# Patient Record
Sex: MALE | Race: WHITE
[De-identification: names, ages, dates, MRNs, and addresses within clinical notes are randomized per-mention and may not be internally consistent; named-entity substitution may affect disease eponyms.]

---

## 2020-11-30 ENCOUNTER — APPOINTMENT (OUTPATIENT)
Dept: DISASTER EMERGENCY | Facility: CLINIC | Age: 70
End: 2020-11-30

## 2020-11-30 DIAGNOSIS — Z01.818 ENCOUNTER FOR OTHER PREPROCEDURAL EXAMINATION: ICD-10-CM

## 2020-11-30 PROBLEM — Z00.00 ENCOUNTER FOR PREVENTIVE HEALTH EXAMINATION: Status: ACTIVE | Noted: 2020-11-30

## 2020-12-01 LAB — SARS-COV-2 N GENE NPH QL NAA+PROBE: NOT DETECTED

## 2020-12-07 PROBLEM — Z00.00 ENCOUNTER FOR PREVENTIVE HEALTH EXAMINATION: Noted: 2020-12-07

## 2020-12-14 ENCOUNTER — APPOINTMENT (OUTPATIENT)
Dept: DISASTER EMERGENCY | Facility: CLINIC | Age: 70
End: 2020-12-14

## 2020-12-16 LAB — SARS-COV-2 N GENE NPH QL NAA+PROBE: NOT DETECTED

## 2020-12-20 ENCOUNTER — APPOINTMENT (OUTPATIENT)
Dept: DISASTER EMERGENCY | Facility: CLINIC | Age: 70
End: 2020-12-20

## 2020-12-22 LAB — SARS-COV-2 N GENE NPH QL NAA+PROBE: NOT DETECTED

## 2022-12-02 ENCOUNTER — OFFICE (OUTPATIENT)
Dept: URBAN - METROPOLITAN AREA CLINIC 105 | Facility: CLINIC | Age: 72
Setting detail: OPHTHALMOLOGY
End: 2022-12-02
Payer: COMMERCIAL

## 2022-12-02 DIAGNOSIS — H43.813: ICD-10-CM

## 2022-12-02 DIAGNOSIS — H35.033: ICD-10-CM

## 2022-12-02 PROCEDURE — 92014 COMPRE OPH EXAM EST PT 1/>: CPT | Performed by: OPHTHALMOLOGY

## 2022-12-02 PROCEDURE — 92250 FUNDUS PHOTOGRAPHY W/I&R: CPT | Performed by: OPHTHALMOLOGY

## 2022-12-02 ASSESSMENT — REFRACTION_MANIFEST
OD_CYLINDER: -0.50
OD_AXIS: 065
OS_VA1: 20/25
OS_CYLINDER: -1.25
OS_SPHERE: +1.00
OD_VA1: 20/20
OS_AXIS: 093
OD_SPHERE: +0.75
OS_ADD: +2.25
OD_ADD: +2.25

## 2022-12-02 ASSESSMENT — REFRACTION_CURRENTRX
OD_VPRISM_DIRECTION: PROGS
OS_SPHERE: +1.00
OD_SPHERE: +1.00
OS_AXIS: 090
OD_ADD: +2.00
OD_CYLINDER: -0.25
OD_AXIS: 080
OS_VPRISM_DIRECTION: PROGS
OS_OVR_VA: 20/
OS_CYLINDER: -1.25
OD_OVR_VA: 20/
OS_ADD: +2.00

## 2022-12-02 ASSESSMENT — AXIALLENGTH_DERIVED
OD_AL: 23.4014
OS_AL: 23.1643
OD_AL: 23.3536
OS_AL: 23.4014

## 2022-12-02 ASSESSMENT — CORNEAL EDEMA CLINICAL DESCRIPTION
OS_CORNEALEDEMA: ABSENT
OD_CORNEALEDEMA: ABSENT

## 2022-12-02 ASSESSMENT — REFRACTION_AUTOREFRACTION
OS_AXIS: 085
OD_AXIS: 047
OD_SPHERE: +0.50
OS_SPHERE: +2.00
OS_CYLINDER: -2.00
OD_CYLINDER: -0.25

## 2022-12-02 ASSESSMENT — KERATOMETRY
OD_K2POWER_DIOPTERS: 44.00
OS_AXISANGLE_DEGREES: 180
OD_K1POWER_DIOPTERS: 43.25
OS_K2POWER_DIOPTERS: 44.25
OD_AXISANGLE_DEGREES: 155
OS_K1POWER_DIOPTERS: 43.00

## 2022-12-02 ASSESSMENT — SPHEQUIV_DERIVED
OD_SPHEQUIV: 0.5
OD_SPHEQUIV: 0.375
OS_SPHEQUIV: 0.375
OS_SPHEQUIV: 1

## 2022-12-02 ASSESSMENT — VISUAL ACUITY
OD_BCVA: 20/20-1
OS_BCVA: 20/20-1

## 2022-12-02 ASSESSMENT — CONFRONTATIONAL VISUAL FIELD TEST (CVF)
OS_FINDINGS: FULL
OD_FINDINGS: FULL

## 2023-11-17 ENCOUNTER — HOME HEALTH ADMISSION (OUTPATIENT)
Dept: HOME HEALTH SERVICES | Facility: HOME HEALTH | Age: 73
End: 2023-11-17
Payer: MEDICARE

## 2023-11-18 ENCOUNTER — HOME CARE VISIT (OUTPATIENT)
Dept: SCHEDULING | Facility: HOME HEALTH | Age: 73
End: 2023-11-18
Payer: MEDICARE

## 2023-11-18 PROCEDURE — G0493 RN CARE EA 15 MIN HH/HOSPICE: HCPCS

## 2023-11-21 ENCOUNTER — HOME CARE VISIT (OUTPATIENT)
Dept: SCHEDULING | Facility: HOME HEALTH | Age: 73
End: 2023-11-21
Payer: MEDICARE

## 2023-11-21 VITALS
DIASTOLIC BLOOD PRESSURE: 67 MMHG | HEART RATE: 63 BPM | RESPIRATION RATE: 18 BRPM | TEMPERATURE: 97 F | SYSTOLIC BLOOD PRESSURE: 129 MMHG | OXYGEN SATURATION: 97 %

## 2023-11-21 PROCEDURE — G0152 HHCP-SERV OF OT,EA 15 MIN: HCPCS

## 2023-11-21 ASSESSMENT — ENCOUNTER SYMPTOMS: PAIN LOCATION - PAIN QUALITY: ACHE

## 2023-11-21 NOTE — HOME HEALTH
This patient has answered NO to the following questions:   1) have you traveled outside the country   2) have you been exposed to or been in contact with anyone whom traveled outside the country in the past two weeks   3) do you have a sore throat/fever/dry cough      4)The patient has been educated on and demonstrates good understanding via the teach-back method for the following: Signs and symptoms of COVID-19 yes    Pt is a 68year old male who lives with wife on first story apartment. Pt had a fall a month ago in bathroom and found to have pleural effusion with fluid drained from his lung. Pt followed with rehab and returned home 11/17/23. PMHX includes herniated disc, parkinsons. Pt ambulates in home with cane, uses walker outdoors for longer distances, requiring min assist for safety to reduce risk of falls. Pt transfers with min assist, some LOB demonstrated and decreaed eccentric control when sitting, forgets to reach for surfaces. Pt would benefit with raised toilet seat, shower chair and second  grab bar in shower with step over tub. Pt requires min assist for ADLs with decreased balance . Pt demo's decresaed standing tolerance/balance requiring frequent rest periods with ADL's  and UE support at all times. Pt demo's bilat UE strength 3+/5 for transfers. Pt would benefit with OT to increase safety and indep with ADL's, increase bilat UE strength for functional transfers, adaptive equipment recommendations and training, increasing standing tolerance/balance for safety with ADL's with fewer rest periods. Pt's goal is to have no falls and return to PLOF.    Discussed DC plan  with pt and caregiver, plan to DC when goals met  Pt's signature on paper due to computer issues

## 2023-11-22 ENCOUNTER — HOME CARE VISIT (OUTPATIENT)
Dept: HOME HEALTH SERVICES | Facility: HOME HEALTH | Age: 73
End: 2023-11-22
Payer: MEDICARE

## 2023-11-22 VITALS
RESPIRATION RATE: 18 BRPM | HEART RATE: 72 BPM | TEMPERATURE: 98.2 F | OXYGEN SATURATION: 98 % | DIASTOLIC BLOOD PRESSURE: 70 MMHG | SYSTOLIC BLOOD PRESSURE: 128 MMHG

## 2023-11-22 ASSESSMENT — ENCOUNTER SYMPTOMS: DYSPNEA ACTIVITY LEVEL: AFTER AMBULATING MORE THAN 20 FT

## 2023-11-22 NOTE — HOME HEALTH
SOC for 69 y/o M w/ parkinsons, HTN, Afib, bph, who was recently D/C from rehab on 11/17 s/p fall at home w/ R plueral effusion, rib fx and R shoulder injury. Caregiver involvement: Pt has strong spousal support. Medications reconciled. SN skin check - No wounds at this time. Home health supplies delivered/ordered this visit include: None needed at this time. Patient education provided this visit: Medication management, pt states they are taking medications as prescribed. Infection control and prevention measures, S/S of infection. Home safety, fall precautions. Patient/caregiver response: Verbalizes understanding via the teach back method. Progress toward goals: yes, see care plan  Home exercise program: PT/OT to eval and treat. Plan for next home care visit: SN for medication management/disease process management  The following discharge planning was discussed with the patient/caregiver: home care agency discharge to be completed when goals met. Patient in agreement. Patient remains homebound. See Homebound Status within Visit Record.

## 2023-11-24 ENCOUNTER — HOME CARE VISIT (OUTPATIENT)
Dept: HOME HEALTH SERVICES | Facility: HOME HEALTH | Age: 73
End: 2023-11-24
Payer: MEDICARE

## 2023-11-27 ENCOUNTER — HOME CARE VISIT (OUTPATIENT)
Dept: HOME HEALTH SERVICES | Facility: HOME HEALTH | Age: 73
End: 2023-11-27
Payer: MEDICARE

## 2023-11-30 ENCOUNTER — HOME CARE VISIT (OUTPATIENT)
Dept: SCHEDULING | Facility: HOME HEALTH | Age: 73
End: 2023-11-30
Payer: MEDICARE

## 2023-11-30 VITALS
OXYGEN SATURATION: 97 % | HEART RATE: 85 BPM | DIASTOLIC BLOOD PRESSURE: 81 MMHG | TEMPERATURE: 97.2 F | SYSTOLIC BLOOD PRESSURE: 136 MMHG | RESPIRATION RATE: 18 BRPM

## 2023-11-30 PROCEDURE — G0152 HHCP-SERV OF OT,EA 15 MIN: HCPCS

## 2023-11-30 ASSESSMENT — ENCOUNTER SYMPTOMS: PAIN LOCATION - PAIN QUALITY: ACHE

## 2023-11-30 NOTE — HOME HEALTH
This patient has answered NO to the following questions:   1) have you traveled outside the country   2) have you been exposed to or been in contact with anyone whom traveled outside the country in the past two weeks   3) do you have a sore throat/fever/dry cough      4)The patient has been educated on and demonstrates good understanding via the teach-back method for the following: Signs and symptoms of COVID-19 yes    No reported changes or incidents since previous visit  Patient instructed to continue HEP at least 2  daily for continued progression and benefits. Patient instructed to call 911 in case of medical emergency and to call MD for any changes to medical status. Therapist instructed patient to continue taking medications as prescribed by MD and obtain new prescription/refills from pharmacy as appropriate. Patient educated on importance of keeping all MD/lab/therapy appointments. Patient instructed to keep list of emergency phone numbers by the phone or in their mobile phone and to wash hands frequently to prevent spread of infection. Patient and wife verbalizes understanding. Pt was seen for OT and SN evaluation only, pt and wife requested DC stating he does not need further home care at this time.

## 2024-02-24 ENCOUNTER — HOME HEALTH ADMISSION (OUTPATIENT)
Dept: HOME HEALTH SERVICES | Facility: HOME HEALTH | Age: 74
End: 2024-02-24
Payer: MEDICARE

## 2024-02-25 ENCOUNTER — HOME CARE VISIT (OUTPATIENT)
Dept: HOME HEALTH SERVICES | Facility: HOME HEALTH | Age: 74
End: 2024-02-25

## 2024-02-26 ENCOUNTER — HOME CARE VISIT (OUTPATIENT)
Dept: SCHEDULING | Facility: HOME HEALTH | Age: 74
End: 2024-02-26
Payer: MEDICARE

## 2024-02-26 VITALS
WEIGHT: 174 LBS | RESPIRATION RATE: 18 BRPM | HEIGHT: 74 IN | SYSTOLIC BLOOD PRESSURE: 142 MMHG | OXYGEN SATURATION: 98 % | TEMPERATURE: 98.2 F | DIASTOLIC BLOOD PRESSURE: 80 MMHG | HEART RATE: 87 BPM | BODY MASS INDEX: 22.33 KG/M2

## 2024-02-26 PROCEDURE — G0299 HHS/HOSPICE OF RN EA 15 MIN: HCPCS

## 2024-02-26 ASSESSMENT — ENCOUNTER SYMPTOMS: PAIN LOCATION - PAIN QUALITY: ACHE

## 2024-02-26 NOTE — HOME HEALTH
Reason for the visit: to instruct the patient on meds, disease entity, and s/s to report to MD.  History of present illness: CHF   Chief complaint and PMH: CHF HTN A-FIB  General description of the patient: The patient is an alert male who reside with his wife. He does ambulate with cane in the home and walker outside. He's receptive to instructions and nurse visit.  Assessment, plan and focus of care:Reason for the visit: Continue to instruct on meds and assess cp and v/s on each visit. Reporting any values are not wnl  Reason for need for continued skilled care:  Discussion of care to date: med teaching and disease entity  Assessment, plan and focus of care: on med, and s/s that indicate deterioation of c/p status  Caregiver involvement: The patient's wife is will to assist with ADL, shopping and transportation to appointment  Medication reconcile will continue to reinforce on each visit

## 2024-02-27 ENCOUNTER — HOME CARE VISIT (OUTPATIENT)
Dept: SCHEDULING | Facility: HOME HEALTH | Age: 74
End: 2024-02-27
Payer: MEDICARE

## 2024-02-27 PROCEDURE — G0151 HHCP-SERV OF PT,EA 15 MIN: HCPCS

## 2024-02-28 ENCOUNTER — HOME CARE VISIT (OUTPATIENT)
Dept: HOME HEALTH SERVICES | Facility: HOME HEALTH | Age: 74
End: 2024-02-28
Payer: MEDICARE

## 2024-02-28 VITALS
TEMPERATURE: 97.4 F | SYSTOLIC BLOOD PRESSURE: 111 MMHG | DIASTOLIC BLOOD PRESSURE: 65 MMHG | HEART RATE: 76 BPM | RESPIRATION RATE: 18 BRPM | OXYGEN SATURATION: 94 %

## 2024-02-28 ASSESSMENT — ENCOUNTER SYMPTOMS: PAIN LOCATION - PAIN QUALITY: ACHY, DULL

## 2024-02-28 NOTE — HOME HEALTH
Pt is an 74 yo male s/p increased SOB. Pt was admitted to the hospital secondary to CHF. Pt was transferred to rehab for approximately 2 weeks before returning home. Skilled PT intervention orders received. PMH includes: Parkinson's disease, CHF, Falls, HTN, Sciatica, Arthritis, Prostate CA s/p TURP.    Pt lives with his wife in a first floor condo with 1 small step to enter.  Pt was using a sc vs RW for both indoor and outdoor distances. Pt was able to perform ADL's with some assistance from his wife. Pt's wife is available to assist as needed and drives him to all appointments. Pt presents with impaired BLE strength scoring 3+/5 affecting his ability to transfer safely without assistance, impaired dynamic balance scoring 17/28 on the Tinetti test placing him at increased risk for falls and decreased cv endurance scoring 5/10 on the modified Daria scale showing fatigue with exertion. Pt demonstrates difficulty performing safe functional transfers, standing activities, household ambulation and community ambulation without assistance as able to do in PLOF. Pt alternates between using a sc vs RW vs no AD when ambulating within his condo and uses either hos RW or 4ww for all community distances.Pt demonstrates kyphotic posture with anterior lean, decreased stride length and narrow STEPHANIE.    Pt will benefit from skilled PT intervention in order to improve functional deficits and establish a HEP to allow pt to perform daily functional tasks independently and safely using an appropriate AD for support. Pt is homebound secondary to requiring assistance from his wife to leave home safely, requires an AD for support due to muscle weakness and is at high risk for falls due to impaired balance and impaired eliezer.  PT reviewed POC, tx frequency, tx goals, safety precautions, fall prevention strategies, safe med management and energy conservation techniques.

## 2024-02-29 ENCOUNTER — HOME CARE VISIT (OUTPATIENT)
Dept: SCHEDULING | Facility: HOME HEALTH | Age: 74
End: 2024-02-29
Payer: MEDICARE

## 2024-02-29 VITALS
DIASTOLIC BLOOD PRESSURE: 60 MMHG | HEART RATE: 68 BPM | RESPIRATION RATE: 16 BRPM | OXYGEN SATURATION: 100 % | TEMPERATURE: 97.5 F | SYSTOLIC BLOOD PRESSURE: 124 MMHG

## 2024-02-29 PROCEDURE — G0152 HHCP-SERV OF OT,EA 15 MIN: HCPCS

## 2024-02-29 PROCEDURE — G0157 HHC PT ASSISTANT EA 15: HCPCS

## 2024-02-29 ASSESSMENT — ENCOUNTER SYMPTOMS
PAIN LOCATION - PAIN QUALITY: ACHE
PAIN LOCATION - PAIN QUALITY: ACHY

## 2024-02-29 NOTE — HOME HEALTH
Pt is  73 y.o male L handed refered to Home Health for the dx [facility] CHF. Pt went to the hospital  followed by rehab .    PMH: Parkinson, fall   DME: Shower chair, grab bar x1, sc, 2w  Pt lives with his wife in a alba on the first floor , they moved from [place] in October of 2023 and are still adjusting and unpacking.  Pt is using SC for ambulation in the house with noted unsteady gait  and required extra time to return to midline when making turns. Pt presents with generalized muscle rigidity related to hx of Parkninson's as well as decreased FMC displaying difficulty handliling regular utensils. Also round shoulder and neck in forward flex interfering with functional reach and functional balance. Pt requires asssitance with self care handling cloths safely viki retrieving cloths from bags, drawers and suitcases. Pt presents with generalized wekaness and rports Daria Scale at 5/10 strong  ( heavy ) .   Pt could benefit from OT  for ongoing training on Self care / ADL's , light IADL's, functional transfers/ Mobility, DME recommendations and training.  UB, trunk  strengthening and activity toleance. FMC- GMC and CG training, FMC and GMC.  Pt/ cg in agreement  with plan of care and also  discharge planning was discussed. DC when goals met, pt reaches max rehab potential or as per pt request. Pt to remain at home with   wife's .   / support.   - Pt was provided with gentle stretches to BUE and postural exercises while sitting unsupported and in standing position. Pt tired esily

## 2024-03-01 ENCOUNTER — HOME CARE VISIT (OUTPATIENT)
Dept: SCHEDULING | Facility: HOME HEALTH | Age: 74
End: 2024-03-01
Payer: MEDICARE

## 2024-03-01 PROCEDURE — G0299 HHS/HOSPICE OF RN EA 15 MIN: HCPCS

## 2024-03-01 ASSESSMENT — ENCOUNTER SYMPTOMS: DYSPNEA ACTIVITY LEVEL: AFTER AMBULATING MORE THAN 20 FT

## 2024-03-01 NOTE — HOME HEALTH
Pt states he uses the cane inside the home and walker when he goes out with wife. He is having some achy pain in his back 6/10. Wife reports sometimes its difficult to get pt out of chair so they have to elevate recliner to help.     Gait training: Amb with FWW, SBA unlevel surface 75ft. Forward posture, shuffle gait, decrease foot clearance, decrease heel strike, decrease knee extension with more flexed knee posture. Required 75% VC for hip knee flexion, taking larger steps with focus on striking heel first, and for postural correction to improve pt safety with amb.   Stretching to B hamstrings due to tight hamstrings esepcially noted during gait training. Instructed pt to elevate foot rest on recliner, added small pillow from ankle to mid calf area to allow passive stretching of hamstrings. Manually raised pt legs to increase stretch and instructed pt in ankle dorsiflexion to stretch gastrocs. Educated pt and spouse on importance of stretching hamstrings and elevating legs throughout the day to prevent any further tightness to hamstrings which may limit pt abiltiy to amb. Both verbalized understanding through teach back method.   Therex: seated with LE extended 10reps x 2 ankle pumps and quad sets with VC for pushing kneeds down to further straighten knee. With LE flexed; instructed in hip and with yellow band, hip flexion, LAQ, and ham curls. VC for big movements to facilitate full range of motion. Exercise handout given to pt and reviewed for HEP to be completed daily.    Due to gait deviations, pt is at risk for falls due to decrease in foot clearance and shuffle gait. Able to show improved knee extension post stretching. Pt and wife are both very excited for PT and pt should do well. Plan for next visit: gait training with focus on decreasing deviations to normalize gait, balance training, stretching, and strength training to imprve overall safety within pt home. DC when goals met.

## 2024-03-03 VITALS
RESPIRATION RATE: 18 BRPM | SYSTOLIC BLOOD PRESSURE: 128 MMHG | DIASTOLIC BLOOD PRESSURE: 70 MMHG | HEART RATE: 72 BPM | TEMPERATURE: 97.8 F | OXYGEN SATURATION: 98 %

## 2024-03-04 ENCOUNTER — HOME CARE VISIT (OUTPATIENT)
Dept: SCHEDULING | Facility: HOME HEALTH | Age: 74
End: 2024-03-04
Payer: MEDICARE

## 2024-03-04 VITALS
TEMPERATURE: 98.4 F | HEART RATE: 76 BPM | DIASTOLIC BLOOD PRESSURE: 82 MMHG | OXYGEN SATURATION: 99 % | SYSTOLIC BLOOD PRESSURE: 138 MMHG

## 2024-03-04 PROCEDURE — G0299 HHS/HOSPICE OF RN EA 15 MIN: HCPCS

## 2024-03-04 PROCEDURE — G0158 HHC OT ASSISTANT EA 15: HCPCS

## 2024-03-04 ASSESSMENT — ENCOUNTER SYMPTOMS: PAIN LOCATION - PAIN QUALITY: ACHE

## 2024-03-05 ENCOUNTER — HOME CARE VISIT (OUTPATIENT)
Dept: SCHEDULING | Facility: HOME HEALTH | Age: 74
End: 2024-03-05
Payer: MEDICARE

## 2024-03-05 VITALS
OXYGEN SATURATION: 100 % | SYSTOLIC BLOOD PRESSURE: 122 MMHG | DIASTOLIC BLOOD PRESSURE: 72 MMHG | RESPIRATION RATE: 18 BRPM | HEART RATE: 83 BPM | TEMPERATURE: 97.2 F

## 2024-03-05 VITALS
OXYGEN SATURATION: 99 % | SYSTOLIC BLOOD PRESSURE: 128 MMHG | TEMPERATURE: 97.5 F | HEART RATE: 90 BPM | RESPIRATION RATE: 17 BRPM | DIASTOLIC BLOOD PRESSURE: 64 MMHG

## 2024-03-05 PROCEDURE — G0157 HHC PT ASSISTANT EA 15: HCPCS

## 2024-03-05 ASSESSMENT — ENCOUNTER SYMPTOMS
PAIN LOCATION - PAIN QUALITY: ACHE
PAIN LOCATION - PAIN QUALITY: ACHE

## 2024-03-05 NOTE — HOME HEALTH
Pt and wife agreeable to Tx . No reported med changes No incidenets. No c/o pain during Tx. Pt did reported pain in low back was 6 /10 this morning prior to taking tylenol  LE s 2/10 with Tx . Pt provided with HEP for Upper body strengthening for improved posture and resistance for increased safety and endurance. Pt tolerated well in reps of 10 in multiple directions with verbal cues and demonstration to complete properly. Dynamic sitting completed for core strengthening . Pt amb in home with cane with fair balance. Pt had early appointment this morning and then waited in the car for his wife to complete some grocery shopping . Pt reported some LE fatigue after multiple care transfers, but reported no problem getting into and out of the car. Pt reports requriing A to don socks and sometimes pants over feet REviewed Energy conservation and pacing techniques. Pt reports resting between sets and allowing his wife when he becomes fatigued with LE tasks . Pt will advance toward goals and dc when met. WIll review HEP next tx and address ADLs further. Pt and wife cooperative and pleasant with Tx.

## 2024-03-06 ENCOUNTER — HOME CARE VISIT (OUTPATIENT)
Dept: SCHEDULING | Facility: HOME HEALTH | Age: 74
End: 2024-03-06
Payer: MEDICARE

## 2024-03-06 PROCEDURE — G0299 HHS/HOSPICE OF RN EA 15 MIN: HCPCS

## 2024-03-06 NOTE — HOME HEALTH
Upon arrival pt just finishing up his breakfast. Pt reports no changes in medications. He is feeling some pain in L leg this morning at 7/10 but just took  a tylenol. He has been doing his HEP daily. Pt reports he gets tired quickly when doing some tasks and notices he has to rest a while.     Gait training with SPC in pt home to improve safety in home; CGA; 50ft going into each room. Presents with extremely NBOS with medial aspect of feet touching, decrease in step length and height, decrease heel strike, decrease arm swing, and forward posture. VC given for correction to deviations to improve foot clearance, wider STEPHANIE to improve stability, and forward gaze to improve posture. Noted unsteadiness especially during full turns.  Seated therex: 10reps seated therex; heel toe raises, hip flexion, hip abd with yellow band, ham curls with yellow band, and LAQ with VC for proper form to decrease hip flexion with task. Limited in knee extension due to tight hamstrings. Able to return demo all but 1 exercise correctly for HEP. Educated on continuation of HEP daily. Pt verbalized understanding.  Blance training: Shoulder width stance; reaching BUE to L and R 10reps at shoulder level and below shoulder. VC for cervical rotation with task to improve ROM of trunk rotation. 3reps each in \"T\" \"I\" and \"Y\" exercise with arms and VC for reaching full range to stretch pec and UE areas while focus on dynamic balance stability.   Stretching: Passive B hamstrings and gastroc stretching 30sec x3, and trunk with pt seated and end range hold 5sec x 5 to improve muscle flexibility and prevent stiffness. VC for proper relaxation as pt continued to tense up muscles, pt eventually able to relax for stretching.    Balance deficits which puts pt at risk for falls especially noted during gait training. Stiffness and foward posture with all given tasks. Showing good understanding of exercises. Plan next visit: improve balance stability with dyamic

## 2024-03-07 ENCOUNTER — HOME CARE VISIT (OUTPATIENT)
Dept: SCHEDULING | Facility: HOME HEALTH | Age: 74
End: 2024-03-07
Payer: MEDICARE

## 2024-03-07 VITALS
RESPIRATION RATE: 18 BRPM | OXYGEN SATURATION: 98 % | SYSTOLIC BLOOD PRESSURE: 128 MMHG | HEART RATE: 82 BPM | TEMPERATURE: 96.8 F | DIASTOLIC BLOOD PRESSURE: 82 MMHG

## 2024-03-07 VITALS
DIASTOLIC BLOOD PRESSURE: 62 MMHG | HEART RATE: 57 BPM | OXYGEN SATURATION: 100 % | TEMPERATURE: 97.7 F | RESPIRATION RATE: 17 BRPM | SYSTOLIC BLOOD PRESSURE: 110 MMHG

## 2024-03-07 PROCEDURE — G0157 HHC PT ASSISTANT EA 15: HCPCS

## 2024-03-07 ASSESSMENT — ENCOUNTER SYMPTOMS: PAIN LOCATION - PAIN QUALITY: ACHE

## 2024-03-08 NOTE — HOME HEALTH
Patient received skilled nursing care today to  monitor v/s and cp status   Caregiver involvement: nassist with home management, meals and transportation  Medications reconciled. The patient understood the purpose for each medication.   Patient education provided this visit: instructed on disease entity and s/s that would indicate deterioation in status. Stress the need to report these to MD or nurse   Medication management, taking medications as prescribed. Maintaining meds on a schedule  Home safety, fall precautions Stress the importance of using assistive divice when ambulating  Call Me First procedure, S/S to report to nurse, physician, and that necessitate calling emergency personnel   Diet/Nutrition sodium restricted diet reviewed and foods to limit on this diet was discussed  Patient/caregiver response: Patient and caregiver verbalized understanding of these instructions  Plan for next home care visit: check v/s and assess cp status  The following discharge planning was discussed with the patient/caregiver: home care agency discharge to be completed when goals met. Patient in agreement.   This patient remains homebound. See Homebound Status within Visit Record.

## 2024-03-08 NOTE — HOME HEALTH
Pt states he is seeing podiatrist this morning. He didn't sleep well last night becuse he keeps wetting the bed. He has been keeping up with all his exercises. No changes to medications. States his wife took him to a  yesterday who stretched him out.     Gait training with SPC in pt home 100ft, SBA, presernts with NBOS; B feet occasonally rubbing together and 1x cross stepping, forward flexed posture with decrease in knee extension with more crouched gait. VC for knee extension, increasing step length, upright posture, wider STEPHANIE, and prope placement of cane to side more to improve safety as pt occsionally would kick cane as it is right infront of his foot. pt reports pain to low back toward end of gait training at 5/10; relieved with seated rest. Noted unsteadiness occsionally.   Stretching to improve muscle flexibility and mobility. 30sec x 3 each with back against the wall instructed in knee extension and trunk extension to improve postural flexbility and seated hip adductor stretching passively for carryover to ability to keep wider stance during amb.   Balance training as follows for prevention of falls. Lateral stepping 10steps to each side with cane and then without cane with VC for taking bigger steps and keeping knees and trunk extended, backwards amb [id number] with SPC and VC for toe to heel progression with wider STEPHANIE, and forward amb with wider STEPHANIE and upright posture 10ft.     Continues to be fall risk noted with unsteadiness with gait due to deviations of NBOS, improper plcmeent of cane, and occasional cross stepping. did experience back pain with gait however relieved after sitting and stretches. Continues to show balance deficits and tight muscles which limits pt safety and mobility. Plan: continue to work on dynamic balance, stretching of muscles to improve mobility for all functional tasks, and ensuring pt safety with amb and safe use of SPC. Dc when goals met.

## 2024-03-11 ENCOUNTER — HOME CARE VISIT (OUTPATIENT)
Dept: SCHEDULING | Facility: HOME HEALTH | Age: 74
End: 2024-03-11
Payer: MEDICARE

## 2024-03-11 VITALS
TEMPERATURE: 97.6 F | DIASTOLIC BLOOD PRESSURE: 60 MMHG | OXYGEN SATURATION: 98 % | HEART RATE: 78 BPM | SYSTOLIC BLOOD PRESSURE: 118 MMHG | RESPIRATION RATE: 16 BRPM

## 2024-03-11 VITALS
TEMPERATURE: 97.5 F | OXYGEN SATURATION: 96 % | HEART RATE: 87 BPM | SYSTOLIC BLOOD PRESSURE: 138 MMHG | RESPIRATION RATE: 16 BRPM | DIASTOLIC BLOOD PRESSURE: 78 MMHG

## 2024-03-11 PROCEDURE — G0152 HHCP-SERV OF OT,EA 15 MIN: HCPCS

## 2024-03-11 PROCEDURE — G0299 HHS/HOSPICE OF RN EA 15 MIN: HCPCS

## 2024-03-11 ASSESSMENT — ENCOUNTER SYMPTOMS: DYSPNEA ACTIVITY LEVEL: AFTER AMBULATING 10 - 20 FT

## 2024-03-11 NOTE — HOME HEALTH
SN for skilled assessments, disease and medication management/teaching.  VS all wnl, denies any increased shob, falls, any new open wounds, problems/questions at this time.  Medications reconciled, denies any changes in medication since last SN visit.  No s/s of distress during SN visit.  Patient DC from SN services with all goals met. SN provided education on disease processess, safety and medication management. Patient verbalized understanding of education provided and when to call MD. PT to continue with services

## 2024-03-12 ENCOUNTER — HOME CARE VISIT (OUTPATIENT)
Dept: SCHEDULING | Facility: HOME HEALTH | Age: 74
End: 2024-03-12
Payer: MEDICARE

## 2024-03-12 VITALS
DIASTOLIC BLOOD PRESSURE: 62 MMHG | OXYGEN SATURATION: 98 % | SYSTOLIC BLOOD PRESSURE: 120 MMHG | RESPIRATION RATE: 18 BRPM | TEMPERATURE: 97.3 F | HEART RATE: 69 BPM

## 2024-03-12 PROCEDURE — G0151 HHCP-SERV OF PT,EA 15 MIN: HCPCS

## 2024-03-12 ASSESSMENT — ENCOUNTER SYMPTOMS: DYSPNEA ACTIVITY LEVEL: AFTER AMBULATING MORE THAN 20 FT

## 2024-03-12 NOTE — HOME HEALTH
Pt has been dc as per his and his wife request [place]. Pt was progressing toward established goals . No reported med changes No incidenets. No c/o pain during Tx. He is taking tylenol to manage back pain. Pt following  HEP consitency and demosntrated good understanding with only min cv for proper form. HEP consisting of Upper body strengthening for improved posture and resistance for increased safety and endurance. Dynamic sitting completed for core strengthening  and during UB dressing which pt completed with CGA after initial set up .  Pt amb in home with cane with fair balance, stiffness and foward posture with all given tasks. Pt an  appointment this morning for stretching. Pt reports resting between sets and allowing his wife when he becomes fatigued with LE tasks .

## 2024-03-12 NOTE — HOME HEALTH
Pt is moving to an [facility] in [place] close to where his son lives. Pt's wife requested dc from all  services.

## 2024-03-13 ENCOUNTER — HOME CARE VISIT (OUTPATIENT)
Dept: SCHEDULING | Facility: HOME HEALTH | Age: 74
End: 2024-03-13
Payer: MEDICARE